# Patient Record
Sex: FEMALE | Race: WHITE | Employment: OTHER | URBAN - METROPOLITAN AREA
[De-identification: names, ages, dates, MRNs, and addresses within clinical notes are randomized per-mention and may not be internally consistent; named-entity substitution may affect disease eponyms.]

---

## 2022-11-23 RX ORDER — ZINC GLUCONATE 50 MG
1 TABLET ORAL
COMMUNITY

## 2022-11-23 RX ORDER — GARLIC EXTRACT 500 MG
1 CAPSULE ORAL DAILY
COMMUNITY

## 2022-11-23 RX ORDER — OMEPRAZOLE 20 MG/1
20 CAPSULE, DELAYED RELEASE ORAL
COMMUNITY

## 2022-11-23 RX ORDER — VITAMIN B COMPLEX
1 TABLET ORAL DAILY
COMMUNITY

## 2022-11-23 RX ORDER — RIBOFLAVIN (VITAMIN B2) 100 MG
1000 TABLET ORAL DAILY
COMMUNITY

## 2022-11-26 ENCOUNTER — LAB ENCOUNTER (OUTPATIENT)
Dept: LAB | Age: 67
End: 2022-11-26
Attending: OTOLARYNGOLOGY
Payer: MEDICARE

## 2022-11-26 DIAGNOSIS — Z20.822 ENCOUNTER FOR PREOPERATIVE SCREENING LABORATORY TESTING FOR COVID-19 VIRUS: ICD-10-CM

## 2022-11-26 DIAGNOSIS — Z01.812 ENCOUNTER FOR PREOPERATIVE SCREENING LABORATORY TESTING FOR COVID-19 VIRUS: ICD-10-CM

## 2022-11-27 LAB — SARS-COV-2 RNA RESP QL NAA+PROBE: NOT DETECTED

## 2022-11-29 ENCOUNTER — HOSPITAL ENCOUNTER (OUTPATIENT)
Facility: HOSPITAL | Age: 67
Setting detail: HOSPITAL OUTPATIENT SURGERY
Discharge: HOME | End: 2022-11-29
Attending: OTOLARYNGOLOGY | Admitting: OTOLARYNGOLOGY
Payer: MEDICARE

## 2022-11-29 ENCOUNTER — ANESTHESIA (OUTPATIENT)
Dept: SURGERY | Facility: HOSPITAL | Age: 67
End: 2022-11-29
Payer: MEDICARE

## 2022-11-29 ENCOUNTER — ANESTHESIA EVENT (OUTPATIENT)
Dept: SURGERY | Facility: HOSPITAL | Age: 67
End: 2022-11-29
Payer: MEDICARE

## 2022-11-29 VITALS
RESPIRATION RATE: 18 BRPM | HEART RATE: 81 BPM | OXYGEN SATURATION: 92 % | WEIGHT: 222.88 LBS | SYSTOLIC BLOOD PRESSURE: 142 MMHG | BODY MASS INDEX: 33.78 KG/M2 | HEIGHT: 68 IN | TEMPERATURE: 98 F | DIASTOLIC BLOOD PRESSURE: 79 MMHG

## 2022-11-29 DIAGNOSIS — Z20.822 ENCOUNTER FOR PREOPERATIVE SCREENING LABORATORY TESTING FOR COVID-19 VIRUS: Primary | ICD-10-CM

## 2022-11-29 DIAGNOSIS — Z01.812 ENCOUNTER FOR PREOPERATIVE SCREENING LABORATORY TESTING FOR COVID-19 VIRUS: Primary | ICD-10-CM

## 2022-11-29 PROCEDURE — 88341 IMHCHEM/IMCYTCHM EA ADD ANTB: CPT | Performed by: OTOLARYNGOLOGY

## 2022-11-29 PROCEDURE — 88305 TISSUE EXAM BY PATHOLOGIST: CPT | Performed by: OTOLARYNGOLOGY

## 2022-11-29 PROCEDURE — 88307 TISSUE EXAM BY PATHOLOGIST: CPT | Performed by: OTOLARYNGOLOGY

## 2022-11-29 PROCEDURE — 0GTH0ZZ RESECTION OF RIGHT THYROID GLAND LOBE, OPEN APPROACH: ICD-10-PCS | Performed by: OTOLARYNGOLOGY

## 2022-11-29 PROCEDURE — 0GTJ0ZZ RESECTION OF THYROID GLAND ISTHMUS, OPEN APPROACH: ICD-10-PCS | Performed by: OTOLARYNGOLOGY

## 2022-11-29 PROCEDURE — 88342 IMHCHEM/IMCYTCHM 1ST ANTB: CPT | Performed by: OTOLARYNGOLOGY

## 2022-11-29 PROCEDURE — 0GTN0ZZ RESECTION OF RIGHT INFERIOR PARATHYROID GLAND, OPEN APPROACH: ICD-10-PCS | Performed by: OTOLARYNGOLOGY

## 2022-11-29 RX ORDER — NALOXONE HYDROCHLORIDE 0.4 MG/ML
80 INJECTION, SOLUTION INTRAMUSCULAR; INTRAVENOUS; SUBCUTANEOUS AS NEEDED
Status: DISCONTINUED | OUTPATIENT
Start: 2022-11-29 | End: 2022-11-29

## 2022-11-29 RX ORDER — MEPERIDINE HYDROCHLORIDE 25 MG/ML
12.5 INJECTION INTRAMUSCULAR; INTRAVENOUS; SUBCUTANEOUS AS NEEDED
Status: DISCONTINUED | OUTPATIENT
Start: 2022-11-29 | End: 2022-11-29

## 2022-11-29 RX ORDER — LIDOCAINE HYDROCHLORIDE 10 MG/ML
INJECTION, SOLUTION EPIDURAL; INFILTRATION; INTRACAUDAL; PERINEURAL AS NEEDED
Status: DISCONTINUED | OUTPATIENT
Start: 2022-11-29 | End: 2022-11-29 | Stop reason: SURG

## 2022-11-29 RX ORDER — HYDROCODONE BITARTRATE AND ACETAMINOPHEN 5; 325 MG/1; MG/1
1-2 TABLET ORAL EVERY 4 HOURS PRN
Qty: 30 TABLET | Refills: 0 | Status: SHIPPED | OUTPATIENT
Start: 2022-11-29

## 2022-11-29 RX ORDER — LIDOCAINE HYDROCHLORIDE AND EPINEPHRINE 10; 10 MG/ML; UG/ML
INJECTION, SOLUTION INFILTRATION; PERINEURAL AS NEEDED
Status: DISCONTINUED | OUTPATIENT
Start: 2022-11-29 | End: 2022-11-29 | Stop reason: HOSPADM

## 2022-11-29 RX ORDER — HYDROCODONE BITARTRATE AND ACETAMINOPHEN 10; 325 MG/1; MG/1
2 TABLET ORAL ONCE AS NEEDED
Status: DISCONTINUED | OUTPATIENT
Start: 2022-11-29 | End: 2022-11-29

## 2022-11-29 RX ORDER — SODIUM CHLORIDE, SODIUM LACTATE, POTASSIUM CHLORIDE, CALCIUM CHLORIDE 600; 310; 30; 20 MG/100ML; MG/100ML; MG/100ML; MG/100ML
INJECTION, SOLUTION INTRAVENOUS CONTINUOUS
Status: DISCONTINUED | OUTPATIENT
Start: 2022-11-29 | End: 2022-11-29

## 2022-11-29 RX ORDER — HYDROCODONE BITARTRATE AND ACETAMINOPHEN 10; 325 MG/1; MG/1
1 TABLET ORAL ONCE AS NEEDED
Status: DISCONTINUED | OUTPATIENT
Start: 2022-11-29 | End: 2022-11-29

## 2022-11-29 RX ORDER — DEXAMETHASONE SODIUM PHOSPHATE 4 MG/ML
VIAL (ML) INJECTION AS NEEDED
Status: DISCONTINUED | OUTPATIENT
Start: 2022-11-29 | End: 2022-11-29 | Stop reason: SURG

## 2022-11-29 RX ORDER — ACETAMINOPHEN 500 MG
1000 TABLET ORAL ONCE
Status: DISCONTINUED | OUTPATIENT
Start: 2022-11-29 | End: 2022-11-29 | Stop reason: HOSPADM

## 2022-11-29 RX ORDER — ACETAMINOPHEN 500 MG
1000 TABLET ORAL ONCE AS NEEDED
Status: DISCONTINUED | OUTPATIENT
Start: 2022-11-29 | End: 2022-11-29

## 2022-11-29 RX ORDER — METOCLOPRAMIDE HYDROCHLORIDE 5 MG/ML
10 INJECTION INTRAMUSCULAR; INTRAVENOUS EVERY 8 HOURS PRN
Status: DISCONTINUED | OUTPATIENT
Start: 2022-11-29 | End: 2022-11-29

## 2022-11-29 RX ORDER — HYDROMORPHONE HYDROCHLORIDE 1 MG/ML
0.4 INJECTION, SOLUTION INTRAMUSCULAR; INTRAVENOUS; SUBCUTANEOUS EVERY 5 MIN PRN
Status: DISCONTINUED | OUTPATIENT
Start: 2022-11-29 | End: 2022-11-29

## 2022-11-29 RX ORDER — ONDANSETRON 2 MG/ML
INJECTION INTRAMUSCULAR; INTRAVENOUS AS NEEDED
Status: DISCONTINUED | OUTPATIENT
Start: 2022-11-29 | End: 2022-11-29 | Stop reason: SURG

## 2022-11-29 RX ORDER — ONDANSETRON 2 MG/ML
4 INJECTION INTRAMUSCULAR; INTRAVENOUS EVERY 6 HOURS PRN
Status: DISCONTINUED | OUTPATIENT
Start: 2022-11-29 | End: 2022-11-29

## 2022-11-29 RX ORDER — HYDROMORPHONE HYDROCHLORIDE 1 MG/ML
0.2 INJECTION, SOLUTION INTRAMUSCULAR; INTRAVENOUS; SUBCUTANEOUS EVERY 5 MIN PRN
Status: DISCONTINUED | OUTPATIENT
Start: 2022-11-29 | End: 2022-11-29

## 2022-11-29 RX ORDER — MIDAZOLAM HYDROCHLORIDE 1 MG/ML
1 INJECTION INTRAMUSCULAR; INTRAVENOUS EVERY 5 MIN PRN
Status: DISCONTINUED | OUTPATIENT
Start: 2022-11-29 | End: 2022-11-29

## 2022-11-29 RX ORDER — HYDROMORPHONE HYDROCHLORIDE 1 MG/ML
0.6 INJECTION, SOLUTION INTRAMUSCULAR; INTRAVENOUS; SUBCUTANEOUS EVERY 5 MIN PRN
Status: DISCONTINUED | OUTPATIENT
Start: 2022-11-29 | End: 2022-11-29

## 2022-11-29 RX ADMIN — LIDOCAINE HYDROCHLORIDE 50 MG: 10 INJECTION, SOLUTION EPIDURAL; INFILTRATION; INTRACAUDAL; PERINEURAL at 17:56:00

## 2022-11-29 RX ADMIN — SODIUM CHLORIDE, SODIUM LACTATE, POTASSIUM CHLORIDE, CALCIUM CHLORIDE: 600; 310; 30; 20 INJECTION, SOLUTION INTRAVENOUS at 18:58:00

## 2022-11-29 RX ADMIN — DEXAMETHASONE SODIUM PHOSPHATE 8 MG: 4 MG/ML VIAL (ML) INJECTION at 18:12:00

## 2022-11-29 RX ADMIN — ONDANSETRON 4 MG: 2 INJECTION INTRAMUSCULAR; INTRAVENOUS at 18:33:00

## 2022-11-29 RX ADMIN — SODIUM CHLORIDE, SODIUM LACTATE, POTASSIUM CHLORIDE, CALCIUM CHLORIDE: 600; 310; 30; 20 INJECTION, SOLUTION INTRAVENOUS at 17:51:00

## 2022-11-29 NOTE — H&P
History & Physical Examination    Name: Rogelio Kc  Patient ID:  [id number]  Date:  11/29/2022  YOB: 1955 AGE:  75year old SEX:  female      M.D./D.O./D.P.M PERFORMING SURGERY/PROCEDURE:    Estrellita Velazquez MD    Diagnosis:  THYROID NODULE    Procedure: RIGHT THYROID LOBECTOMY WITH ISTHMUSECTOMY    Anesthesia Type: General    Allergies:   Penicillins             SWELLING  Erythromycin            RASH  Sulfa Antibiotics       RASH    Medications:  No current outpatient medications on file. Medical  History:  has a past medical history of Back problem, Cancer ([facility] [address] Disorder of thyroid, Esophageal reflux, Heart attack ([facility] [address] History of blood transfusion, Osteoarthritis, PONV (postoperative nausea and vomiting), and Sleep apnea. She has no past medical history of Difficult intubation, Family history of malignant hyperthermia, Family history of pseudocholinesterase deficiency, History of adverse reaction to anesthesia, motion sickness, Malignant hyperthermia, or Pseudocholinesterase deficiency. Surgical History:  has a past surgical history that includes appendectomy; cholecystectomy; spine surgery procedure unlisted; hysterectomy; other surgical history; other surgical history; and other surgical history (2019). Family History: family history is not on file. Physical Exam:     System Review is Normal Exam is Normal If not normal, explain   HEENT yes yes    NECK & BACK yes yes    HEART yes yes    LUNGS yes yes    ABDOMEN n/a n/a    UROGENITAL n/a n/a    EXTREMITIES yes yes    MENTAL STATUS yes yes        Impression: right suspicious thyroid nodule    Plan: To OR for right hemithryoidectomy    I have discussed the risks and benefits and alternatives with the patient/family. They understand and agree to proceed with the plan of care. Yes    Interval Note:    I have reviewed the History and Physical done within the last 30 days. Any changes are noted above. Drew Jiménez MD 11/29/22

## 2022-11-30 NOTE — ANESTHESIA PROCEDURE NOTES
Airway  Date/Time: 11/29/2022 5:58 PM  Urgency: elective    Difficult airway    General Information and Staff    Patient location during procedure: OR  Anesthesiologist: Sandra Burciaga MD  Performed: anesthesiologist     Indications and Patient Condition  Indications for airway management: anesthesia  Sedation level: deep  Preoxygenated: yes  Patient position: sniffing  Mask difficulty assessment: 1 - vent by mask  Planned trial extubation    Final Airway Details  Final airway type: endotracheal airway      Successful airway: ETT  Cuffed: yes   Successful intubation technique: direct laryngoscopy  Endotracheal tube insertion site: oral  Blade: Dagoberto    Cormack-Lehane Classification: grade IIA - partial view of glottis  Placement verified by: chest auscultation and capnometry   Measured from: lips  ETT to lips (cm): 20  Number of attempts at approach: 1

## 2022-11-30 NOTE — DISCHARGE SUMMARY
Outpatient Surgery Brief Discharge Summary         Patient ID:  Brendan Yoon  [id number]  75year old  3/28/1955    Discharge Diagnoses: THYROID NODULE    Procedures: right hemithyroid, right inf para    Discharged Condition: stable    Disposition: home    Patient Instructions: Follow-up with Nargis Hamlin MD in 1-2 weeks.     Diet: regular diet  Activity: reduced    Nargis Hamlin MD  11/29/2022  7:00 PM

## 2022-11-30 NOTE — OPERATIVE REPORT
Alli Lee Patient Status:  Hospital Outpatient Surgery    3/28/1955 MRN [id number]   [facility] SURGERY Attending Joe Weston MD   Hosp Day # 0 PCP Kenrick Meyers MD     Thyroid Op Note  Pre-Op Diagnosis:  Thyroid nodule  Post-Op Diagnosis:  Same  Procedure:  Right Hemithyroidectomy with isthmusectomy, right inferior parathyroidectomy  Surgeon: Simone Melo  Anesthesia: General  Procedure in Detail:  Patient taken to the operating room and laid supine on the operating table. A NIMS monitoring ET tube was used during the procedure for laryngeal nerve monitoring. After adequate IV and endotracheal anesthesia, the table was brought down slightly. A shoulder roll was placed. The patient was prepped and draped in standard fashion. The cricoid cartilage, sternal notch and thyroid notch were palpated and outlined with a surgical marker. Approximately 5cc of 1% lidocaine with epinephrine was injected. A curvilinear skin incision was made in a skin crease. The subfascial and subcutaneous tissues were dissected with electrobovie cautery. The anterior layer of the deep cervical fascia was identified. Electrobovie cautery was used to incise at the midline. The median raphe was identified and the strap muscles were retracted laterally. The right thyroid lobe was visualized. Using blunt dissection, all remaining strap musculature was retracted laterally exposing only the thyroid. The superior portion of the lobe was retracted inferiorly and medially. With blunt dissection, the superior pole vasculature was visualized. These vessels were individually clipped for hemostasis. The middle thyroid vein was then bluntly dissected and clipped. During this portion of the procedure a massive parathyroid gland was noted, highly suspicious for an adenoma. This was removed and sent as a separate specimen.   This allowed for further medial retraction of the thyroid lobe.  Using the inferior thyroid artery for landmarks, the recurrent laryngeal nerve was identified. All thyroid tissue was elevated off the trachea while keeping visualization of the RLN. The isthmus was then clamped and divided. The midline straps were reapproximated with interupted vicryls and the platysma and sub Q closed with vicryls. The skin was closed with a running monocryl suture and mastisol and steristrips used for dressing. All instruments were removed and the patient was given back to anesthesia. The sponge, needle and instrument counts were correct at the end of the case. There were no complications. I performed all parts of this procedure.   EBL:  20 cc  IVF:  100cc LR  Specimens:  Right perez and parathyroid  Condition:  stable  Veronica Velázquze MD  11/29/2022  6:57 PM

## 2022-11-30 NOTE — DISCHARGE INSTRUCTIONS
Call [facility] ENT clinic at [phone] or if it is after hours ask to have the doctor on call paged or go to the nearest Emergency Room if you have:    * Any worsening neck swelling or redness  * Any difficulty or changes to your breathing. * A temperature greater than 102F  * Vomiting that lasts more than 24 hours  * Severe pain that gets worse and is not helped by medicine  * Coughing that will not go away  * Neck pain, stiffness or has a hard time turning their head    Call with any other questions or concerns    Appointments you need to make: You should make a follow up appointment for 1 weeks after surgery. What to expect:  * You will have some neck stiffness and soreness  * This should improve over the next couple of days. Pain:  * Your may have acetaminophen (Tylenol) every 4 to 6 hours   * Your doctor may prescribe stronger pain medicine, follow your doctor's instructions for taking pain medicines  * If your doctor gives you a stronger pain medicine (roxicet or lortab), please remember that these medications contain acetaminophen (Tylenol) already. So please do NOT give Roxicet/Lortab and additional acetaminophen (tylenol) at the SAME time. * If you need additional pain medication, please call the nursing line at [phone] x [phone]    Diet:  * Start with clear liquids (flat white soda, water, broth, apple juice, and popsicles)  * If you do not have an upset stomach when fully awake from surgery, a soft diet can be started. Avoid spicy, acidic or rough foods (includes toast, crackers, and potato chips)    Activity:  * Recovery takes 1-2 weeks. Avoid rough play, gym, swimming, and contact sports during this time  * Avoid heavy lifting or straining until you are seen in clinic for follow up  * You can bathe from the neck down starting today. Wait 3 days before getting the incision wet. The steristrips will fall off on their own. Do not remove them.       If you develop numbness or tingling around the mouth, fingers, or toes take 3 TUMS and call the office.      With any concerns or questions, or ANY bleeding, call and ask for the ENT on call physician

## (undated) DEVICE — HARMONIC FOCUS SHEARS 9CM LENGTH + ADAPTIVE TISSUE TECHNOLOGY FOR USE WITH BLUE HAND PIECE ONLY: Brand: HARMONIC FOCUS

## (undated) DEVICE — MEGADYNE E-Z CLEAN BLADE 2.75"

## (undated) DEVICE — SPONGE: SPECIALTY PEANUT XR 100/CS: Brand: MEDICAL ACTION INDUSTRIES

## (undated) DEVICE — HEMOSTAT ARISTA 1GRAM

## (undated) DEVICE — SLEEVE KENDALL SCD EXPRESS MED

## (undated) DEVICE — CABLE BIPOLAR 12FT DISPOSABLE

## (undated) DEVICE — SOLUTION  .9 1000ML BTL

## (undated) DEVICE — 3M™ STERI-STRIP™ REINFORCED ADHESIVE SKIN CLOSURES, R1547, 1/2 IN X 4 IN (12 MM X 100 MM), 6 STRIPS/ENVELOPE: Brand: 3M™ STERI-STRIP™

## (undated) DEVICE — RETRACT LONE STAR STAYS DULL

## (undated) DEVICE — APPLICATOR CHLORAPREP 10.5ML

## (undated) DEVICE — MEDI-VAC SUCTION FINE CAPACITY: Brand: CARDINAL HEALTH

## (undated) DEVICE — HEAD AND NECK CDS-LF: Brand: MEDLINE INDUSTRIES, INC.

## (undated) DEVICE — PAD SACRAL PREMIUM 12X12X1

## (undated) DEVICE — PROBE 8225101 5PK STD PRASS FL TIP ROHS

## (undated) DEVICE — SUT MONOCRYL 4-0 PS-2 Y496G

## (undated) DEVICE — AIRWAY ENDO  TRIVANTAGE 7MM

## (undated) DEVICE — STERILE POLYISOPRENE POWDER-FREE SURGICAL GLOVES: Brand: PROTEXIS

## (undated) DEVICE — SUT VICRYL 3-0 SH J416H